# Patient Record
(demographics unavailable — no encounter records)

---

## 2025-01-22 NOTE — HISTORY OF PRESENT ILLNESS
[FreeTextEntry1] : 37yo  LMP 25 here for annual Gyn exam. Periods are coming closer together and lasting 7-8d.(this includes spotting for a day or two before real flow and staining after). Casie requests annual Pap. 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 + FH of breast CA- MGM  at 51yo. Annual mammo/sono recommended.  had serious accident last year(crushed by 2000lbs of shower door glass at work) but has recovered well. She works from home with her mother/ insurance. Her kids are 6 and 8yo.  Total Pre  Full Term: 2  Premature: 0  Abortions: 0  Livin #1 (12/1/15): Vaginal delivery of 7lb9oz boy. Delivery occurred at [facility]. Pregnancy #1 Comments: ("Darnell"). #2 (): Vaginal delivery of 8lb5oz girl. Pregnancy #2 Comments: ("Azalea Levine"). [Mammogramdate] : 10/23/24 [TextBox_19] : BIRADS 4A T-C Risk 15.3% [BreastSonogramDate] : 10/23/24-> 11/5/24 [TextBox_25] : left Core Biopsy-> fibroadenoma [PapSmeardate] : 12/19/23 [TextBox_31] : Neg/HPV Neg